# Patient Record
Sex: MALE | Race: WHITE | NOT HISPANIC OR LATINO | Employment: STUDENT | URBAN - METROPOLITAN AREA
[De-identification: names, ages, dates, MRNs, and addresses within clinical notes are randomized per-mention and may not be internally consistent; named-entity substitution may affect disease eponyms.]

---

## 2020-02-05 ENCOUNTER — OFFICE VISIT (OUTPATIENT)
Dept: ORTHOPEDIC SURGERY | Facility: CLINIC | Age: 20
End: 2020-02-05

## 2020-02-05 VITALS
WEIGHT: 193 LBS | DIASTOLIC BLOOD PRESSURE: 81 MMHG | HEART RATE: 87 BPM | HEIGHT: 66 IN | SYSTOLIC BLOOD PRESSURE: 128 MMHG | BODY MASS INDEX: 31.02 KG/M2

## 2020-02-05 DIAGNOSIS — S82.035A CLOSED NONDISPLACED TRANSVERSE FRACTURE OF LEFT PATELLA, INITIAL ENCOUNTER: ICD-10-CM

## 2020-02-05 DIAGNOSIS — M25.562 ACUTE PAIN OF LEFT KNEE: Primary | ICD-10-CM

## 2020-02-05 PROCEDURE — 99203 OFFICE O/P NEW LOW 30 MIN: CPT | Performed by: FAMILY MEDICINE

## 2020-02-05 RX ORDER — ACETAMINOPHEN 325 MG/1
650 TABLET ORAL EVERY 6 HOURS PRN
COMMUNITY
End: 2020-05-04

## 2020-03-04 ENCOUNTER — OFFICE VISIT (OUTPATIENT)
Dept: ORTHOPEDIC SURGERY | Facility: CLINIC | Age: 20
End: 2020-03-04

## 2020-03-04 VITALS
HEIGHT: 66 IN | WEIGHT: 198 LBS | DIASTOLIC BLOOD PRESSURE: 77 MMHG | BODY MASS INDEX: 31.82 KG/M2 | SYSTOLIC BLOOD PRESSURE: 119 MMHG | HEART RATE: 91 BPM

## 2020-03-04 DIAGNOSIS — S82.035A CLOSED NONDISPLACED TRANSVERSE FRACTURE OF LEFT PATELLA, INITIAL ENCOUNTER: Primary | ICD-10-CM

## 2020-03-04 PROBLEM — J00 COMMON COLD: Status: ACTIVE | Noted: 2020-03-04

## 2020-03-04 PROBLEM — J35.01 CHRONIC TONSILLITIS: Status: ACTIVE | Noted: 2020-03-04

## 2020-03-04 PROBLEM — J30.9 ALLERGIC RHINITIS: Status: ACTIVE | Noted: 2020-03-04

## 2020-03-04 PROBLEM — R11.2 NAUSEA AND VOMITING: Status: ACTIVE | Noted: 2018-02-05

## 2020-03-04 PROBLEM — J02.9 PHARYNGITIS: Status: ACTIVE | Noted: 2020-03-04

## 2020-03-04 PROBLEM — M79.10 MUSCLE PAIN: Status: ACTIVE | Noted: 2020-03-04

## 2020-03-04 PROBLEM — A08.4 VIRAL GASTROENTERITIS: Status: ACTIVE | Noted: 2018-12-19

## 2020-03-04 PROBLEM — J45.990 EXERCISE-INDUCED ASTHMA: Status: ACTIVE | Noted: 2020-03-04

## 2020-03-04 PROCEDURE — 99213 OFFICE O/P EST LOW 20 MIN: CPT | Performed by: FAMILY MEDICINE

## 2020-03-09 NOTE — PROGRESS NOTES
"Primary Care Sports Medicine Office Visit Note     Patient ID: Pako Chaney is a 19 y.o. male.    Chief Complaint:  Chief Complaint   Patient presents with   • Left Knee - Follow-up     HPI:    Mr. Pako Chaney is a 19 y.o. male who presents to the clinic today for follow-up of closed nondisplaced transverse fractureOf the left patella.  He states he is been in T scope brace locked out with no movement about the knee pas instructed, using crutches for the last 4 weeks.  He has had no setbacks or problems.  He denies any acute or overt pain about the inferior aspect the patella anymore.  Seems to be doing well.    Past Medical History:   Diagnosis Date   • Allergic rhinitis 3/4/2020   • Exercise-induced asthma 3/4/2020       Past Surgical History:   Procedure Laterality Date   • WISDOM TOOTH EXTRACTION         Family History   Problem Relation Age of Onset   • No Known Problems Mother    • No Known Problems Father      Social History     Occupational History   • Not on file   Tobacco Use   • Smoking status: Never Smoker   • Smokeless tobacco: Never Used   Substance and Sexual Activity   • Alcohol use: Never     Frequency: Never   • Drug use: Not on file   • Sexual activity: Not on file      Review of Systems   Constitutional: Negative for activity change, fatigue and fever.   Musculoskeletal: Positive for arthralgias.   Skin: Negative for color change and rash.   Neurological: Negative for numbness.       Objective:    /77 (BP Location: Right arm, Patient Position: Sitting, Cuff Size: Large Adult)   Pulse 91   Ht 167.6 cm (66\")   Wt 89.8 kg (198 lb)   BMI 31.96 kg/m²     Physical Examination:  Physical Exam   Constitutional: He appears well-developed and well-nourished. No distress.   HENT:   Head: Normocephalic and atraumatic.   Eyes: Conjunctivae are normal.   Cardiovascular: Intact distal pulses.   Pulmonary/Chest: Effort normal. No respiratory distress.   Musculoskeletal:        Left knee: He " "exhibits no effusion.   Neurological: He is alert.   Skin: Skin is warm. Capillary refill takes less than 2 seconds. He is not diaphoretic.   Nursing note and vitals reviewed.    Left Ankle Exam     Range of Motion   The patient has normal left ankle ROM.       Left Knee Exam     Muscle Strength   The patient has normal left knee strength.    Tenderness   The patient is experiencing no tenderness.     Range of Motion   Extension: normal   Left knee flexion: Mild decrease in flexion to about 110 degrees.     Tests   Valgus: negative    Other   Erythema: absent  Effusion: no effusion present    Comments:  Mild quadriceps intervention      Left Hip Exam     Range of Motion   The patient has normal left hip ROM.          Imaging and other tests:  Repeat XR today shows no obvious healing, callus, or change to previous transverse patellar fracture.    Assessment and Plan:    1. Closed nondisplaced transverse fracture of left patella, initial encounter  - XR Knee 1 or 2 View Left    No obvious healing is shown on x-ray today, continue T scope for 4 more weeks.  RTC at that time for 8-week XR,  Otherwise continue conservative management.    Conor ESCALANTE \"Chance\" Maurice FRANK DO, CAQSM  03/09/20  6:00 PM    Disclaimer: Please note that areas of this note were completed with computer voice recognition software.  Quite often unanticipated grammatical, syntax, homophones, and other interpretive errors are inadvertently transcribed by the computer software. Please excuse any errors that have escaped final proofreading.  "

## 2020-05-04 ENCOUNTER — OFFICE VISIT (OUTPATIENT)
Dept: ORTHOPEDIC SURGERY | Facility: CLINIC | Age: 20
End: 2020-05-04

## 2020-05-04 VITALS
WEIGHT: 198 LBS | SYSTOLIC BLOOD PRESSURE: 138 MMHG | TEMPERATURE: 98 F | DIASTOLIC BLOOD PRESSURE: 76 MMHG | BODY MASS INDEX: 31.82 KG/M2 | HEART RATE: 102 BPM | HEIGHT: 66 IN

## 2020-05-04 DIAGNOSIS — S82.035A CLOSED NONDISPLACED TRANSVERSE FRACTURE OF LEFT PATELLA, INITIAL ENCOUNTER: Primary | ICD-10-CM

## 2020-05-04 DIAGNOSIS — S82.035K: ICD-10-CM

## 2020-05-04 PROCEDURE — 99213 OFFICE O/P EST LOW 20 MIN: CPT | Performed by: FAMILY MEDICINE

## 2020-05-04 NOTE — PROGRESS NOTES
"Primary Care Sports Medicine Office Visit Note     Patient ID: Pako Chaney is a 19 y.o. male.    Chief Complaint:  Chief Complaint   Patient presents with   • Left Knee - Follow-up     HPI:    Mr. Pako Chaney is a 19 y.o. male who returns to the clinic today for further evaluation of known left distal transverse patellar fracture.  The patient experienced a fracture about 90 days ago.  He was opposed to have return for follow-up visit at 60 days and was lost to follow-up.  He states that this weekend he was attempting to walk/jog on this lower extremity without having performed any physical therapy, and he was not wearing his brace.  He felt a significant sharp pain at the distal aspect of his patella in the area of the known fracture again, that is concerning to him.  He returns today, XR below.    Past Medical History:   Diagnosis Date   • Allergic rhinitis 3/4/2020   • Exercise-induced asthma 3/4/2020       Past Surgical History:   Procedure Laterality Date   • WISDOM TOOTH EXTRACTION         Family History   Problem Relation Age of Onset   • No Known Problems Mother    • No Known Problems Father      Social History     Occupational History   • Not on file   Tobacco Use   • Smoking status: Never Smoker   • Smokeless tobacco: Never Used   Substance and Sexual Activity   • Alcohol use: Never     Frequency: Never   • Drug use: Not on file   • Sexual activity: Not on file      Review of Systems   Constitutional: Negative for activity change, fatigue and fever.   Musculoskeletal: Positive for arthralgias.   Skin: Negative for color change and rash.   Neurological: Negative for numbness.       Objective:    /76   Pulse 102   Temp 98 °F (36.7 °C)   Ht 167.6 cm (66\")   Wt 89.8 kg (198 lb)   BMI 31.96 kg/m²     Physical Examination:  Physical Exam   Constitutional: He appears well-developed and well-nourished. No distress.   HENT:   Head: Normocephalic and atraumatic.   Eyes: Conjunctivae are " "normal.   Cardiovascular: Intact distal pulses.   Pulmonary/Chest: Effort normal. No respiratory distress.   Musculoskeletal:        Right knee: He exhibits no effusion.   Neurological: He is alert.   Skin: Skin is warm. Capillary refill takes less than 2 seconds. He is not diaphoretic.   Nursing note and vitals reviewed.    Right Knee Exam     Tenderness   The patient is experiencing tenderness in the patella.    Range of Motion   Extension: normal   Flexion: normal     Tests   Iglesia:  Medial - negative Lateral - negative  Lachman:  Anterior - negative      Drawer:  Anterior - negative      Patellar apprehension: negative    Other   Erythema: absent  Sensation: normal  Pulse: present  Swelling: mild  Effusion: no effusion present    Comments:  Quadricep strength 4/5, extensor mechanism intact          Imaging and other tests:  Three-view XR of the left knee today shows continued fracture/nonunion of distal patella transverse fracture previously seen initially on date 2/5/2020.    Assessment and Plan:    1. 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  I will attempt to get boot to stimulator as this is now considered a nonunion fracture.  I discussed with him bone stimulator use.  I like to see him back in 4 weeks.    Conor ESCALANTE \"Chance\" Maurice FRANK DO, CAQSM  05/04/20  18:01    Disclaimer: Please note that areas of this note were completed with computer voice recognition software.  Quite often unanticipated grammatical, syntax, homophones, and other interpretive errors are inadvertently transcribed by the computer software. Please excuse any errors that have escaped final proofreading.  "

## 2020-06-17 ENCOUNTER — OFFICE VISIT (OUTPATIENT)
Dept: ORTHOPEDIC SURGERY | Facility: CLINIC | Age: 20
End: 2020-06-17

## 2020-06-17 VITALS
SYSTOLIC BLOOD PRESSURE: 129 MMHG | HEIGHT: 66 IN | HEART RATE: 78 BPM | WEIGHT: 195 LBS | DIASTOLIC BLOOD PRESSURE: 81 MMHG | TEMPERATURE: 98 F | BODY MASS INDEX: 31.34 KG/M2

## 2020-06-17 DIAGNOSIS — M25.562 ACUTE PAIN OF LEFT KNEE: ICD-10-CM

## 2020-06-17 DIAGNOSIS — S82.035K: Primary | ICD-10-CM

## 2020-06-17 PROCEDURE — 99213 OFFICE O/P EST LOW 20 MIN: CPT | Performed by: FAMILY MEDICINE

## 2020-06-17 RX ORDER — IBUPROFEN 600 MG/1
TABLET ORAL
COMMUNITY
Start: 2020-06-15

## 2020-06-17 NOTE — PROGRESS NOTES
"Primary Care Sports Medicine Office Visit Note     Patient ID: Pako Chaney is a 19 y.o. male.    Chief Complaint:  Chief Complaint   Patient presents with   • Left Knee - Follow-up     HPI:    Mr. Pako Chaney is a 19 y.o. male who presents to the clinic today for follow-up of inferior pole of the patella fracture.  The patient was lost to follow-up due to COVID-19, and unfortunately has not gotten his bone stimulator.  Not a whole lot of changes in symptomatology, or pain since previous visit.     Past Medical History:   Diagnosis Date   • Allergic rhinitis 3/4/2020   • Exercise-induced asthma 3/4/2020       Past Surgical History:   Procedure Laterality Date   • WISDOM TOOTH EXTRACTION         Family History   Problem Relation Age of Onset   • No Known Problems Mother    • No Known Problems Father      Social History     Occupational History   • Not on file   Tobacco Use   • Smoking status: Never Smoker   • Smokeless tobacco: Never Used   Substance and Sexual Activity   • Alcohol use: Never     Frequency: Never   • Drug use: Defer   • Sexual activity: Defer      Review of Systems   Constitutional: Negative for activity change, fatigue and fever.   Musculoskeletal: Positive for arthralgias.   Skin: Negative for color change and rash.   Neurological: Negative for numbness.       Objective:    /81   Pulse 78   Temp 98 °F (36.7 °C) (Oral)   Ht 167.6 cm (66\")   Wt 88.5 kg (195 lb)   BMI 31.47 kg/m²     Physical Examination:  Physical Exam   Constitutional: He appears well-developed and well-nourished. No distress.   HENT:   Head: Normocephalic and atraumatic.   Eyes: Conjunctivae are normal.   Cardiovascular: Intact distal pulses.   Pulmonary/Chest: Effort normal. No respiratory distress.   Neurological: He is alert.   Skin: Skin is warm. Capillary refill takes less than 2 seconds. He is not diaphoretic.   Nursing note and vitals reviewed.    Left Knee Exam     Tenderness   Left knee tenderness " "location: Inferior pole the patella.    Range of Motion   Left knee extension: Limited.   Left knee flexion: Limited.     Other   Erythema: absent  Sensation: normal  Pulse: present  Swelling: none    Comments:  Quadriceps muscle atrophy          Imaging and other tests:  Three-view XR of the left knee again shows nonunion of horizontal inferior patellar pole fracture.    Assessment and Plan:    1. 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 Acute pain of left knee  - XR Knee 3 View Left    No significant change in XR as above, very mild healing.  Greater than 90 days since initial fracture.  Will again attempt bone stimulator for nonunion.  RTC in 2 weeks after receiving bone stimulator and wearing it daily for repeat imaging.    Conor ESCALANTE \"Chance\" Maurice FRANK DO, CAQSM  06/17/20  10:23    Disclaimer: Please note that areas of this note were completed with computer voice recognition software.  Quite often unanticipated grammatical, syntax, homophones, and other interpretive errors are inadvertently transcribed by the computer software. Please excuse any errors that have escaped final proofreading.  "

## 2020-08-10 ENCOUNTER — OFFICE VISIT (OUTPATIENT)
Dept: ORTHOPEDIC SURGERY | Facility: CLINIC | Age: 20
End: 2020-08-10

## 2020-08-10 VITALS
WEIGHT: 210 LBS | BODY MASS INDEX: 33.89 KG/M2 | SYSTOLIC BLOOD PRESSURE: 125 MMHG | DIASTOLIC BLOOD PRESSURE: 81 MMHG | HEART RATE: 76 BPM

## 2020-08-10 DIAGNOSIS — S82.035K: Primary | ICD-10-CM

## 2020-08-10 PROCEDURE — 99213 OFFICE O/P EST LOW 20 MIN: CPT | Performed by: FAMILY MEDICINE

## 2020-08-10 NOTE — PROGRESS NOTES
Primary Care Sports Medicine Office Visit Note     Patient ID: Pako Chaney is a 19 y.o. male.    Chief Complaint:  Chief Complaint   Patient presents with   • Left Knee - Follow-up     HPI:    Mr. Pako Chaney is a 19 y.o. male who presents to the clinic today for follow-up evaluation of inferior pole patella fracture.  Patient states he is been doing pretty well without any pain or problems.  For the first time, in the last 3-4 months since wearing a brace, he no longer has tenderness palpation to the inferior aspect of his patella.  His range of motion is full with limitation of the brace only, to 90 degrees of flexion.  Otherwise, he seems to be doing incredibly well without any pain or problems today.    Past Medical History:   Diagnosis Date   • Allergic rhinitis 3/4/2020   • Exercise-induced asthma 3/4/2020       Past Surgical History:   Procedure Laterality Date   • WISDOM TOOTH EXTRACTION         Family History   Problem Relation Age of Onset   • No Known Problems Mother    • No Known Problems Father      Social History     Occupational History   • Not on file   Tobacco Use   • Smoking status: Never Smoker   • Smokeless tobacco: Never Used   Substance and Sexual Activity   • Alcohol use: Never     Frequency: Never   • Drug use: Defer   • Sexual activity: Defer      Review of Systems   Constitutional: Negative for activity change, fatigue and fever.   Musculoskeletal: Positive for arthralgias.   Skin: Negative for color change and rash.   Neurological: Negative for numbness.       Objective:    /81   Pulse 76   Wt 95.3 kg (210 lb)   BMI 33.89 kg/m²     Physical Examination:  Physical Exam   Constitutional: He appears well-developed and well-nourished. No distress.   HENT:   Head: Normocephalic and atraumatic.   Eyes: Conjunctivae are normal.   Cardiovascular: Intact distal pulses.   Pulmonary/Chest: Effort normal. No respiratory distress.   Musculoskeletal:        Left knee: He exhibits no  "effusion.   Neurological: He is alert.   Skin: Skin is warm. Capillary refill takes less than 2 seconds. He is not diaphoretic.   Nursing note and vitals reviewed.    Left Ankle Exam     Range of Motion   The patient has normal left ankle ROM.       Left Knee Exam     Muscle Strength   The patient has normal left knee strength.    Tenderness   The patient is experiencing no tenderness.     Range of Motion   Extension: normal   Flexion:  110 abnormal     Other   Erythema: absent  Sensation: normal  Pulse: present  Swelling: none  Effusion: no effusion present      Left Hip Exam     Range of Motion   The patient has normal left hip ROM.          Imaging and other tests:  3V XR L knee shows increased interval healing response and callous formation about inferior patellar pole fracture.    Assessment and Plan:    1. 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 Start PT for gentle ROM, stretching and eventual strenghtening of quad. RTC 2 weeks, for hopeful brace removal.     Conor ESCALANTE \"Chance\" Maurice FRANK DO, CAQSM  08/10/20  14:08    Disclaimer: Please note that areas of this note were completed with computer voice recognition software.  Quite often unanticipated grammatical, syntax, homophones, and other interpretive errors are inadvertently transcribed by the computer software. Please excuse any errors that have escaped final proofreading.  "